# Patient Record
Sex: FEMALE | Race: WHITE
[De-identification: names, ages, dates, MRNs, and addresses within clinical notes are randomized per-mention and may not be internally consistent; named-entity substitution may affect disease eponyms.]

---

## 2019-09-19 ENCOUNTER — HOSPITAL ENCOUNTER (EMERGENCY)
Dept: HOSPITAL 9 - MADERS | Age: 34
Discharge: HOME | End: 2019-09-19
Payer: COMMERCIAL

## 2019-09-19 DIAGNOSIS — B02.9: Primary | ICD-10-CM

## 2019-09-19 DIAGNOSIS — F17.210: ICD-10-CM

## 2019-09-19 PROCEDURE — 71045 X-RAY EXAM CHEST 1 VIEW: CPT

## 2019-09-19 PROCEDURE — 93005 ELECTROCARDIOGRAM TRACING: CPT

## 2019-09-19 NOTE — RAD
EXAM: Single view of the chest



HISTORY:   Chest pain with possible shingles



COMPARISON: None



FINDINGS: Single view of the chest shows a normal sized cardiomediastinal silhouette. There is no dre
dence of consolidation, mass, or pleural effusion. The bones are unremarkable.



IMPRESSION: No evidence of acute cardiopulmonary disease



Reported By: Yuniel Mckenzie 

Electronically Signed:  9/19/2019 10:38 PM

## 2019-10-05 ENCOUNTER — HOSPITAL ENCOUNTER (EMERGENCY)
Dept: HOSPITAL 9 - MADERS | Age: 34
LOS: 1 days | Discharge: HOME | End: 2019-10-06
Payer: COMMERCIAL

## 2019-10-05 ENCOUNTER — HOSPITAL ENCOUNTER (EMERGENCY)
Dept: HOSPITAL 9 - MADERS | Age: 34
Discharge: HOME | End: 2019-10-05
Payer: COMMERCIAL

## 2019-10-05 DIAGNOSIS — K02.9: ICD-10-CM

## 2019-10-05 DIAGNOSIS — F17.210: ICD-10-CM

## 2019-10-05 DIAGNOSIS — K04.7: Primary | ICD-10-CM

## 2019-10-05 DIAGNOSIS — J32.0: ICD-10-CM

## 2019-10-05 DIAGNOSIS — K03.81: ICD-10-CM

## 2019-10-05 DIAGNOSIS — Z79.899: ICD-10-CM

## 2019-10-05 LAB
ALBUMIN SERPL BCG-MCNC: 4.8 G/DL (ref 3.5–5)
ALP SERPL-CCNC: 70 U/L (ref 40–110)
ALT SERPL W P-5'-P-CCNC: 36 U/L (ref 8–55)
ANION GAP SERPL CALC-SCNC: 15 MMOL/L (ref 10–20)
AST SERPL-CCNC: 17 U/L (ref 5–34)
BILIRUB SERPL-MCNC: 1.2 MG/DL (ref 0.2–1.2)
BUN SERPL-MCNC: 11 MG/DL (ref 7–18.7)
CALCIUM SERPL-MCNC: 9.8 MG/DL (ref 7.8–10.44)
CHLORIDE SERPL-SCNC: 101 MMOL/L (ref 98–107)
CO2 SERPL-SCNC: 27 MMOL/L (ref 22–29)
CREAT CL PREDICTED SERPL C-G-VRATE: 0 ML/MIN (ref 70–130)
GLOBULIN SER CALC-MCNC: 2.6 G/DL (ref 2.4–3.5)
GLUCOSE SERPL-MCNC: 91 MG/DL (ref 70–105)
HGB BLD-MCNC: 16.3 G/DL (ref 12–16)
MCH RBC QN AUTO: 32.3 PG (ref 27–31)
MCV RBC AUTO: 94 FL (ref 78–98)
MDIFF COMPLETE?: YES
PLATELET # BLD AUTO: 298 THOU/UL (ref 130–400)
POTASSIUM SERPL-SCNC: 4.4 MMOL/L (ref 3.5–5.1)
RBC # BLD AUTO: 5.04 MILL/UL (ref 4.2–5.4)
SODIUM SERPL-SCNC: 139 MMOL/L (ref 136–145)
WBC # BLD AUTO: 10.2 THOU/UL (ref 4.8–10.8)

## 2019-10-05 PROCEDURE — 96372 THER/PROPH/DIAG INJ SC/IM: CPT

## 2019-10-05 PROCEDURE — 80053 COMPREHEN METABOLIC PANEL: CPT

## 2019-10-05 PROCEDURE — 85025 COMPLETE CBC W/AUTO DIFF WBC: CPT

## 2019-10-05 PROCEDURE — 99283 EMERGENCY DEPT VISIT LOW MDM: CPT

## 2020-06-13 ENCOUNTER — HOSPITAL ENCOUNTER (EMERGENCY)
Dept: HOSPITAL 9 - MADERS | Age: 35
Discharge: HOME | End: 2020-06-13
Payer: COMMERCIAL

## 2020-06-13 DIAGNOSIS — F17.210: ICD-10-CM

## 2020-06-13 DIAGNOSIS — K08.89: Primary | ICD-10-CM

## 2020-06-13 PROCEDURE — 99282 EMERGENCY DEPT VISIT SF MDM: CPT
